# Patient Record
Sex: FEMALE | Race: WHITE | NOT HISPANIC OR LATINO
[De-identification: names, ages, dates, MRNs, and addresses within clinical notes are randomized per-mention and may not be internally consistent; named-entity substitution may affect disease eponyms.]

---

## 2017-11-27 ENCOUNTER — APPOINTMENT (OUTPATIENT)
Dept: FAMILY MEDICINE | Facility: CLINIC | Age: 32
End: 2017-11-27
Payer: COMMERCIAL

## 2017-11-27 VITALS
HEIGHT: 63 IN | WEIGHT: 241 LBS | DIASTOLIC BLOOD PRESSURE: 90 MMHG | RESPIRATION RATE: 14 BRPM | OXYGEN SATURATION: 97 % | SYSTOLIC BLOOD PRESSURE: 138 MMHG | HEART RATE: 74 BPM | BODY MASS INDEX: 42.7 KG/M2

## 2017-11-27 DIAGNOSIS — Z82.49 FAMILY HISTORY OF ISCHEMIC HEART DISEASE AND OTHER DISEASES OF THE CIRCULATORY SYSTEM: ICD-10-CM

## 2017-11-27 DIAGNOSIS — Z86.69 PERSONAL HISTORY OF OTHER DISEASES OF THE NERVOUS SYSTEM AND SENSE ORGANS: ICD-10-CM

## 2017-11-27 PROCEDURE — 99213 OFFICE O/P EST LOW 20 MIN: CPT

## 2018-08-22 ENCOUNTER — TRANSCRIPTION ENCOUNTER (OUTPATIENT)
Age: 33
End: 2018-08-22

## 2018-08-22 ENCOUNTER — APPOINTMENT (OUTPATIENT)
Dept: FAMILY MEDICINE | Facility: CLINIC | Age: 33
End: 2018-08-22
Payer: COMMERCIAL

## 2018-08-22 VITALS
DIASTOLIC BLOOD PRESSURE: 68 MMHG | RESPIRATION RATE: 14 BRPM | SYSTOLIC BLOOD PRESSURE: 112 MMHG | OXYGEN SATURATION: 99 % | BODY MASS INDEX: 40.12 KG/M2 | WEIGHT: 235 LBS | HEART RATE: 73 BPM | HEIGHT: 64 IN

## 2018-08-22 DIAGNOSIS — Z13.0 ENCOUNTER FOR SCREENING FOR DISEASES OF THE BLOOD AND BLOOD-FORMING ORGANS AND CERTAIN DISORDERS INVOLVING THE IMMUNE MECHANISM: ICD-10-CM

## 2018-08-22 DIAGNOSIS — Z13.21 ENCOUNTER FOR SCREENING FOR NUTRITIONAL DISORDER: ICD-10-CM

## 2018-08-22 DIAGNOSIS — Z13.29 ENCOUNTER FOR SCREENING FOR OTHER SUSPECTED ENDOCRINE DISORDER: ICD-10-CM

## 2018-08-22 DIAGNOSIS — Z13.220 ENCOUNTER FOR SCREENING FOR LIPOID DISORDERS: ICD-10-CM

## 2018-08-22 DIAGNOSIS — Z78.9 OTHER SPECIFIED HEALTH STATUS: ICD-10-CM

## 2018-08-22 DIAGNOSIS — Z13.1 ENCOUNTER FOR SCREENING FOR DIABETES MELLITUS: ICD-10-CM

## 2018-08-22 DIAGNOSIS — Z13.228 ENCOUNTER FOR SCREENING FOR OTHER METABOLIC DISORDERS: ICD-10-CM

## 2018-08-22 DIAGNOSIS — E66.01 MORBID (SEVERE) OBESITY DUE TO EXCESS CALORIES: ICD-10-CM

## 2018-08-22 LAB
BILIRUB UR QL STRIP: NORMAL
CLARITY UR: CLEAR
COLLECTION METHOD: NORMAL
CYTOLOGY CVX/VAG DOC THIN PREP: NORMAL
GLUCOSE UR-MCNC: NORMAL
HCG UR QL: 0.2 EU/DL
HGB UR QL STRIP.AUTO: NORMAL
KETONES UR-MCNC: NORMAL
LEUKOCYTE ESTERASE UR QL STRIP: NORMAL
NITRITE UR QL STRIP: NORMAL
PH UR STRIP: 6.5
PROT UR STRIP-MCNC: NORMAL
SP GR UR STRIP: 1.02

## 2018-08-22 PROCEDURE — 99395 PREV VISIT EST AGE 18-39: CPT | Mod: 25

## 2018-08-22 PROCEDURE — 81003 URINALYSIS AUTO W/O SCOPE: CPT | Mod: QW

## 2018-08-22 RX ORDER — AMOXICILLIN 875 MG/1
875 TABLET, FILM COATED ORAL
Qty: 20 | Refills: 0 | Status: DISCONTINUED | COMMUNITY
Start: 2017-11-27 | End: 2018-08-22

## 2018-08-22 NOTE — HEALTH RISK ASSESSMENT
[Very Good] : ~his/her~ current health as very good [Good] : ~his/her~  mood as  good [No falls in past year] : Patient reported no falls in the past year [0] : 2) Feeling down, depressed, or hopeless: Not at all (0) [Patient reported mammogram was normal] : Patient reported mammogram was normal [HIV test declined] : HIV test declined [Hepatitis C test declined] : Hepatitis C test declined [None] : None [With Significant Other] : lives with significant other [Employed] : employed [College] : College [] :  [Feels Safe at Home] : Feels safe at home [Fully functional (bathing, dressing, toileting, transferring, walking, feeding)] : Fully functional (bathing, dressing, toileting, transferring, walking, feeding) [Fully functional (using the telephone, shopping, preparing meals, housekeeping, doing laundry, using] : Fully functional and needs no help or supervision to perform IADLs (using the telephone, shopping, preparing meals, housekeeping, doing laundry, using transportation, managing medications and managing finances) [Smoke Detector] : smoke detector [Carbon Monoxide Detector] : carbon monoxide detector [Safety elements used in home] : safety elements used in home [Seat Belt] :  uses seat belt [Sunscreen] : uses sunscreen [Discussed at today's visit] : Advance Directives Discussed at today's visit [] : No [de-identified] : occasional [[id number]] : 0 [Change in mental status noted] : No change in mental status noted [Language] : denies difficulty with language [Handling Complex Tasks] : denies difficulty handling complex tasks [Reports changes in hearing] : Reports no changes in hearing [Reports changes in vision] : Reports no changes in vision [Reports changes in dental health] : Reports no changes in dental health [Guns at Home] : no guns at home [MammogramDate] : Jun2011 [PapSmearDate] : 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

## 2018-08-22 NOTE — PHYSICAL EXAM
[No Acute Distress] : no acute distress [Well-Appearing] : well-appearing [Normal Sclera/Conjunctiva] : normal sclera/conjunctiva [PERRL] : pupils equal round and reactive to light [EOMI] : extraocular movements intact [Normal Outer Ear/Nose] : the outer ears and nose were normal in appearance [Normal Oropharynx] : the oropharynx was normal [Normal TMs] : both tympanic membranes were normal [No JVD] : no jugular venous distention [Supple] : supple [No Lymphadenopathy] : no lymphadenopathy [Thyroid Normal, No Nodules] : the thyroid was normal and there were no nodules present [No Respiratory Distress] : no respiratory distress  [Clear to Auscultation] : lungs were clear to auscultation bilaterally [Normal Rate] : normal rate  [Regular Rhythm] : with a regular rhythm [Normal S1, S2] : normal S1 and S2 [No Murmur] : no murmur heard [No Carotid Bruits] : no carotid bruits [No Varicosities] : no varicosities [Pedal Pulses Present] : the pedal pulses are present [No Edema] : there was no peripheral edema [No Extremity Clubbing/Cyanosis] : no extremity clubbing/cyanosis [Soft] : abdomen soft [Non Tender] : non-tender [Normal Bowel Sounds] : normal bowel sounds [Normal Posterior Cervical Nodes] : no posterior cervical lymphadenopathy [Normal Anterior Cervical Nodes] : no anterior cervical lymphadenopathy [No CVA Tenderness] : no CVA  tenderness [No Spinal Tenderness] : no spinal tenderness [No Joint Swelling] : no joint swelling [Grossly Normal Strength/Tone] : grossly normal strength/tone [No Rash] : no rash [Normal Gait] : normal gait [Coordination Grossly Intact] : coordination grossly intact [No Focal Deficits] : no focal deficits [Deep Tendon Reflexes (DTR)] : deep tendon reflexes were 2+ and symmetric [Speech Grossly Normal] : speech grossly normal [Memory Grossly Normal] : memory grossly normal [Normal Affect] : the affect was normal [Alert and Oriented x3] : oriented to person, place, and time [Normal Mood] : the mood was normal [Normal Insight/Judgement] : insight and judgment were intact [de-identified] : obese [de-identified] : left tonsil 2/4 and right is 1/4, no redness, no exudates

## 2018-08-22 NOTE — HISTORY OF PRESENT ILLNESS
[FreeTextEntry1] : pt presents for cpe. +left neck enlargement X3 weeks  Left tonsil enlarged but no sore throat [de-identified] : Here for a PE

## 2018-08-22 NOTE — ASSESSMENT
[FreeTextEntry1] : UA reveiwed with pt. STRESSED DIET and exercise to lose wt - must make it a priority.  Lab done HERE today. Stressed need to schedule a gyn exam with [facility].

## 2018-08-23 LAB
25(OH)D3 SERPL-MCNC: 40.6 NG/ML
ALBUMIN SERPL ELPH-MCNC: 4.9 G/DL
ALP BLD-CCNC: 70 U/L
ALT SERPL-CCNC: 9 U/L
ANION GAP SERPL CALC-SCNC: 16 MMOL/L
AST SERPL-CCNC: 16 U/L
BASOPHILS # BLD AUTO: 0.05 K/UL
BASOPHILS NFR BLD AUTO: 0.7 %
BILIRUB SERPL-MCNC: 0.4 MG/DL
BUN SERPL-MCNC: 13 MG/DL
CALCIUM SERPL-MCNC: 9.6 MG/DL
CHLORIDE SERPL-SCNC: 102 MMOL/L
CHOLEST SERPL-MCNC: 230 MG/DL
CHOLEST/HDLC SERPL: 5.5 RATIO
CO2 SERPL-SCNC: 22 MMOL/L
CREAT SERPL-MCNC: 0.68 MG/DL
EOSINOPHIL # BLD AUTO: 0.16 K/UL
EOSINOPHIL NFR BLD AUTO: 2.2 %
FOLATE SERPL-MCNC: 5.4 NG/ML
GLUCOSE SERPL-MCNC: 89 MG/DL
HBA1C MFR BLD HPLC: 5.3 %
HCT VFR BLD CALC: 42.2 %
HDLC SERPL-MCNC: 42 MG/DL
HGB BLD-MCNC: 13.5 G/DL
IMM GRANULOCYTES NFR BLD AUTO: 0 %
IRON SERPL-MCNC: 120 UG/DL
LDLC SERPL CALC-MCNC: 162 MG/DL
LYMPHOCYTES # BLD AUTO: 2.72 K/UL
LYMPHOCYTES NFR BLD AUTO: 38.1 %
MAN DIFF?: NORMAL
MCHC RBC-ENTMCNC: 29.2 PG
MCHC RBC-ENTMCNC: 32 GM/DL
MCV RBC AUTO: 91.3 FL
MONOCYTES # BLD AUTO: 0.42 K/UL
MONOCYTES NFR BLD AUTO: 5.9 %
NEUTROPHILS # BLD AUTO: 3.78 K/UL
NEUTROPHILS NFR BLD AUTO: 53.1 %
PLATELET # BLD AUTO: 430 K/UL
POTASSIUM SERPL-SCNC: 4.1 MMOL/L
PROT SERPL-MCNC: 8.1 G/DL
RBC # BLD: 4.62 M/UL
RBC # FLD: 13.2 %
SODIUM SERPL-SCNC: 140 MMOL/L
T3 SERPL-MCNC: 115 NG/DL
T3FREE SERPL-MCNC: 3.46 PG/ML
T4 FREE SERPL-MCNC: 1.6 NG/DL
T4 SERPL-MCNC: 8.1 UG/DL
TRIGL SERPL-MCNC: 128 MG/DL
TSH SERPL-ACNC: 2.88 UIU/ML
VIT B12 SERPL-MCNC: 408 PG/ML
WBC # FLD AUTO: 7.13 K/UL

## 2018-10-14 PROBLEM — Z86.718 HISTORY OF DEEP VENOUS THROMBOSIS: Status: RESOLVED | Noted: 2018-10-14 | Resolved: 2018-10-14

## 2018-10-14 PROBLEM — Z86.711 HISTORY OF PULMONARY EMBOLISM: Status: RESOLVED | Noted: 2018-10-14 | Resolved: 2018-10-14

## 2018-10-18 ENCOUNTER — APPOINTMENT (OUTPATIENT)
Dept: FAMILY MEDICINE | Facility: CLINIC | Age: 33
End: 2018-10-18
Payer: COMMERCIAL

## 2018-10-18 DIAGNOSIS — Z86.711 PERSONAL HISTORY OF PULMONARY EMBOLISM: ICD-10-CM

## 2018-10-18 DIAGNOSIS — Z86.718 PERSONAL HISTORY OF OTHER VENOUS THROMBOSIS AND EMBOLISM: ICD-10-CM

## 2019-01-10 ENCOUNTER — APPOINTMENT (OUTPATIENT)
Dept: FAMILY MEDICINE | Facility: CLINIC | Age: 34
End: 2019-01-10
Payer: COMMERCIAL

## 2019-01-10 VITALS
DIASTOLIC BLOOD PRESSURE: 80 MMHG | HEIGHT: 64 IN | OXYGEN SATURATION: 98 % | BODY MASS INDEX: 40.12 KG/M2 | SYSTOLIC BLOOD PRESSURE: 120 MMHG | WEIGHT: 235 LBS | RESPIRATION RATE: 14 BRPM | HEART RATE: 78 BPM

## 2019-01-10 DIAGNOSIS — E28.2 POLYCYSTIC OVARIAN SYNDROME: ICD-10-CM

## 2019-01-10 DIAGNOSIS — Z01.419 ENCOUNTER FOR GYNECOLOGICAL EXAMINATION (GENERAL) (ROUTINE) W/OUT ABNORMAL FINDINGS: ICD-10-CM

## 2019-01-10 PROCEDURE — 99214 OFFICE O/P EST MOD 30 MIN: CPT | Mod: 25

## 2019-01-10 NOTE — COUNSELING
[Breast Self Exam] : breast self exam [Nutrition] : nutrition [Exercise] : exercise [STD (testing, results, tx)] : STD (testing, results, tx) [Method Specific Consent] : method specific consent [Safe Sexual Practices] : safe sexual practices

## 2019-01-10 NOTE — HISTORY OF PRESENT ILLNESS
[___ Year(s) Ago] : [unfilled] year(s) ago [Good] : being in good health [Weight Concerns] : weight concens [Last Pap Smear ___] : last Papanicolaou cytology done [unfilled] [No Previous Mammogram] : no previous mammogram [No Self Breast Exam] : no self breast examinations [No Previous Colonoscopy] : no previous colonoscopy [Performed Within 5 Years] : lipid profile performed within the past five years [Never Performed] : no previous DEXA [Obesity] : obesity [Sedentary Lifestyle] : sedentary lifestyle [FH Cardiovascular Disease] : family history of cardiovascular disease [Reproductive Age] : is of reproductive age [Menstrual Problems] : reports abnormal menses [Pregnancy History] : pregnancy history: [Total Preg ___] : [unfilled] [Full Term ___] : [unfilled] [HPV Vaccine ___] : HPV vaccine [unfilled] [Irregular Menses] : irregular menses [Moderate Bleeding] : described as moderate in severity [___ Weeks] : began [unfilled] weeks ago [Definite:  ___ (Date)] : the last menstrual period was [unfilled] [Normal Amount/Duration] : was of a normal amount and duration [Regular Cycle Intervals] : periods have been regular [Frequency: Q ___ days] : menstrual periods occur approximately every [unfilled] days [Menarche Age: ____] : age at menarche was [unfilled] [Sexually Active] : is sexually active [Monogamous] : is monogamous [Age of First Sexual [place] ___] : became sexually active at the age of [unfilled] [Male ___] : [unfilled] male [Healthy Diet] : not having a healthy diet [Regular Exercise] : not exercising regularly [Hypertension] : no hypertension [Diabetes] : no diabetes [High LDL] : no high LDL cholesterol [Low HDL] : no low HDL cholesterol [Stress] : no stress [Tobacco Use] : no tobacco use [Illicit Drug Use] : no illicit drug use [Previous Breast Cancer] : no previous breast cancer [Abnormal Cervical Cytology] : no abnormal cervical cytology [HPV Positive] : no positive screening for human papilloma virus [Hormone Therapy] : no hormone therapy [Fever] : no fever [Nausea] : no nausea [Vomiting] : no vomiting [Diarrhea] : no diarrhea [Vaginal Bleeding] : no vaginal bleeding [Pelvic Pressure] : no pelvic pressure [Dysuria] : no dysuria [Pain] : no pelvic pain [Rest] : not improved with rest [Exercise] : not worsened with exercise [Localized Pain] : no localized pain [Spotting Between  Menses] : no spotting between menses [Menstrual Cramps] : no menstrual cramps [On BCP at conception] : the patient was not on BCP at conception [Contraception] : does not use contraception

## 2019-01-10 NOTE — PROCEDURE
[Cervical Pap Smear] : cervical Pap smear [Liquid Base] : liquid base [GC & Chlamydia via Pap] : GC & Chlamydia via Pap [Tolerated Well] : the patient tolerated the procedure well

## 2019-01-15 LAB — CYTOLOGY CVX/VAG DOC THIN PREP: NORMAL

## 2020-01-30 ENCOUNTER — TRANSCRIPTION ENCOUNTER (OUTPATIENT)
Age: 35
End: 2020-01-30

## 2020-01-31 ENCOUNTER — TRANSCRIPTION ENCOUNTER (OUTPATIENT)
Age: 35
End: 2020-01-31

## 2020-12-15 PROBLEM — Z86.69 HISTORY OF ACUTE OTITIS MEDIA: Status: RESOLVED | Noted: 2017-11-27 | Resolved: 2020-12-15

## 2020-12-21 PROBLEM — Z01.419 ENCOUNTER FOR GYNECOLOGICAL EXAMINATION WITHOUT ABNORMAL FINDING: Status: RESOLVED | Noted: 2019-01-10 | Resolved: 2020-12-21

## 2023-06-15 ENCOUNTER — LABORATORY RESULT (OUTPATIENT)
Age: 38
End: 2023-06-15

## 2023-06-15 ENCOUNTER — APPOINTMENT (OUTPATIENT)
Dept: FAMILY MEDICINE | Facility: CLINIC | Age: 38
End: 2023-06-15
Payer: COMMERCIAL

## 2023-06-15 ENCOUNTER — NON-APPOINTMENT (OUTPATIENT)
Age: 38
End: 2023-06-15

## 2023-06-15 VITALS
BODY MASS INDEX: 37.05 KG/M2 | DIASTOLIC BLOOD PRESSURE: 82 MMHG | OXYGEN SATURATION: 98 % | RESPIRATION RATE: 14 BRPM | WEIGHT: 217 LBS | SYSTOLIC BLOOD PRESSURE: 110 MMHG | TEMPERATURE: 98.2 F | HEIGHT: 64 IN | HEART RATE: 74 BPM

## 2023-06-15 DIAGNOSIS — Z76.89 PERSONS ENCOUNTERING HEALTH SERVICES IN OTHER SPECIFIED CIRCUMSTANCES: ICD-10-CM

## 2023-06-15 DIAGNOSIS — Z80.3 FAMILY HISTORY OF MALIGNANT NEOPLASM OF BREAST: ICD-10-CM

## 2023-06-15 DIAGNOSIS — E78.5 HYPERLIPIDEMIA, UNSPECIFIED: ICD-10-CM

## 2023-06-15 DIAGNOSIS — Z82.49 FAMILY HISTORY OF ISCHEMIC HEART DISEASE AND OTHER DISEASES OF THE CIRCULATORY SYSTEM: ICD-10-CM

## 2023-06-15 DIAGNOSIS — R00.2 PALPITATIONS: ICD-10-CM

## 2023-06-15 LAB — CYTOLOGY CVX/VAG DOC THIN PREP: NORMAL

## 2023-06-15 PROCEDURE — 36415 COLL VENOUS BLD VENIPUNCTURE: CPT

## 2023-06-15 PROCEDURE — 99204 OFFICE O/P NEW MOD 45 MIN: CPT | Mod: 25

## 2023-06-15 NOTE — HEALTH RISK ASSESSMENT
[0] : 2) Feeling down, depressed, or hopeless: Not at all (0) [PHQ-2 Negative - No further assessment needed] : PHQ-2 Negative - No further assessment needed [Never] : Never [de-identified] : has exercise bike in the basement and walks around the neighborhood  [de-identified] : decreased red meat and pork, more sea food and chicken breasts  [[id number]] : 0

## 2023-06-15 NOTE — HISTORY OF PRESENT ILLNESS
[FreeTextEntry8] : patient presents to establish care \ozzie \ozzie PResenting in office to establish care, in april she has brought herself to the ED with palpitations, afterwards she followed up with three [facility] cardiology and was told she was fine, however needed to establish with a PCP and have updated lab work. \ozzie \ozzie November 2011 moved up here from [place], she is an  in [place] and works from home\ozzie \ozzie Plays video games,

## 2023-06-15 NOTE — PLAN
[FreeTextEntry1] : HLD\par will check fasting labs today\par \par established\par \par RTO 2 weeks for physical examination

## 2023-06-16 LAB
ALBUMIN SERPL ELPH-MCNC: 4.5 G/DL
ALP BLD-CCNC: 76 U/L
ALT SERPL-CCNC: 17 U/L
ANION GAP SERPL CALC-SCNC: 13 MMOL/L
APPEARANCE: ABNORMAL
AST SERPL-CCNC: 18 U/L
BILIRUB SERPL-MCNC: 0.3 MG/DL
BILIRUBIN URINE: NEGATIVE
BLOOD URINE: NEGATIVE
BUN SERPL-MCNC: 13 MG/DL
CALCIUM SERPL-MCNC: 9.7 MG/DL
CHLORIDE SERPL-SCNC: 105 MMOL/L
CHOLEST SERPL-MCNC: 216 MG/DL
CO2 SERPL-SCNC: 23 MMOL/L
COLOR: YELLOW
CREAT SERPL-MCNC: 0.8 MG/DL
EGFR: 98 ML/MIN/1.73M2
ESTIMATED AVERAGE GLUCOSE: 105 MG/DL
FOLATE SERPL-MCNC: 11.6 NG/ML
GLUCOSE QUALITATIVE U: NEGATIVE MG/DL
GLUCOSE SERPL-MCNC: 86 MG/DL
HBA1C MFR BLD HPLC: 5.3 %
HDLC SERPL-MCNC: 46 MG/DL
KETONES URINE: NEGATIVE MG/DL
LDLC SERPL CALC-MCNC: 153 MG/DL
LEUKOCYTE ESTERASE URINE: NEGATIVE
NITRITE URINE: NEGATIVE
NONHDLC SERPL-MCNC: 170 MG/DL
PH URINE: 5.5
POTASSIUM SERPL-SCNC: 4.6 MMOL/L
PROT SERPL-MCNC: 7.4 G/DL
PROTEIN URINE: NEGATIVE MG/DL
SODIUM SERPL-SCNC: 140 MMOL/L
SPECIFIC GRAVITY URINE: 1.02
T3FREE SERPL-MCNC: 3.2 PG/ML
T4 FREE SERPL-MCNC: 1.4 NG/DL
TRIGL SERPL-MCNC: 88 MG/DL
TSH SERPL-ACNC: 3.46 UIU/ML
UROBILINOGEN URINE: 0.2 MG/DL
VIT B12 SERPL-MCNC: 360 PG/ML

## 2023-06-27 ENCOUNTER — APPOINTMENT (OUTPATIENT)
Dept: FAMILY MEDICINE | Facility: CLINIC | Age: 38
End: 2023-06-27

## 2023-10-17 DIAGNOSIS — Z00.00 ENCOUNTER FOR GENERAL ADULT MEDICAL EXAMINATION W/OUT ABNORMAL FINDINGS: ICD-10-CM

## 2023-10-23 ENCOUNTER — LABORATORY RESULT (OUTPATIENT)
Age: 38
End: 2023-10-23

## 2023-10-28 LAB
APO LP(A) SERPL-MCNC: 200.6 NMOL/L
CHOLEST SERPL-MCNC: 227 MG/DL
HDLC SERPL-MCNC: 46 MG/DL
LDLC SERPL CALC-MCNC: 158 MG/DL
NONHDLC SERPL-MCNC: 182 MG/DL
TRIGL SERPL-MCNC: 132 MG/DL

## 2024-01-24 ENCOUNTER — APPOINTMENT (OUTPATIENT)
Dept: GYNECOLOGIC ONCOLOGY | Facility: CLINIC | Age: 39
End: 2024-01-24
Payer: COMMERCIAL

## 2024-01-24 VITALS
SYSTOLIC BLOOD PRESSURE: 138 MMHG | OXYGEN SATURATION: 98 % | HEIGHT: 64 IN | BODY MASS INDEX: 36.7 KG/M2 | DIASTOLIC BLOOD PRESSURE: 92 MMHG | HEART RATE: 73 BPM | RESPIRATION RATE: 16 BRPM | WEIGHT: 215 LBS

## 2024-01-24 DIAGNOSIS — Z80.3 FAMILY HISTORY OF MALIGNANT NEOPLASM OF BREAST: ICD-10-CM

## 2024-01-24 PROCEDURE — 99459 PELVIC EXAMINATION: CPT

## 2024-01-24 PROCEDURE — 99204 OFFICE O/P NEW MOD 45 MIN: CPT | Mod: 25

## 2024-01-24 NOTE — PROCEDURE
[Cervical Pap] : cervical pap smear  [Other: ___] : [unfilled] [Patient] : the patient [Verbal Consent] : verbal consent was obtained prior to the procedure and is detailed in the patient's record [None] : none [Yes] : the specimen was sent to pathology [No Complications] : none [Tolerated Well] : the patient tolerated the procedure well

## 2024-01-25 LAB — HPV HIGH+LOW RISK DNA PNL CVX: NOT DETECTED

## 2024-01-26 NOTE — PLAN
[TextEntry] : MRI pelvis w/wo contrast and tumor markers  Follow up in 2 weeks to discuss results and further treatment plan Screening PAP done today

## 2024-01-26 NOTE — END OF VISIT
[FreeTextEntry3] : This note accurately reflects the work and decisions made by me. Written by Ignacia Payton PA-C acting as a scribe for Dr. Hany Link.

## 2024-01-26 NOTE — CHIEF COMPLAINT
[FreeTextEntry1] : [facility] [phone] at [address] [place] [address]

## 2024-01-26 NOTE — ASSESSMENT
[FreeTextEntry1] : 39 y/o female with findings of possible degenerating fibroid and cystic pelvic mass which contains internal soft tissue and wall nodularity. I discussed with the patient and her  present uncertain if a malignancy is currently present but discussed the possibility of mucinous cystadenoma vs. borderline vs. early ovarian cancer. I discussed recommendation for MRI pelvis to better evaluate cyst characteristics which will also help to determine surgical approach either laparoscopic vs. open. I also recommend for her and her  to determine childbearing status as if a cancer is found it will determine fertility sparing surgery vs. recommended surgery etc.

## 2024-01-26 NOTE — PHYSICAL EXAM
[Chaperone Present] : A chaperone was present in the examining room during all aspects of the physical examination [Normal] : Bimanual Exam: Normal [FreeTextEntry1] : Ignacia Payton PA-C  [de-identified] : palpable fullness noted LLQ  [de-identified] : 8-9 week sized uterus, mobile [de-identified] : No masses or nodularities noted within the pelvis. Likely mass outside of pelvis

## 2024-01-26 NOTE — HISTORY OF PRESENT ILLNESS
[FreeTextEntry1] : 39 y/o nulligravida female, LMP 1/13/24 being referred from [facility] for evaluation of ovarian cyst. She reports she presented to [facility] with left lower abdominal cramping on the day she started her menstrual cycle. She reports her pain has subsided since then and her cycle was as normal. Denies vaginal bleeding/discharge, abdominal pain/bloating/distension, pelvis discomfort, changes in normal bowel/urinary habits, nausea/vomiting, unintentional weight loss/gain, and all other associated signs and symptoms. She is currently sexually active, denies pain and/or bleeding.   1/13/24 CT A/P: 5.0 x 3.6 x 3.7 cm low-density mass within the central portion of the uterus which appears to displace the endometrial cavity to the left side and may represent a fibroid. Bilobed septated 10.9 x 10.1 x 15.8 cm complex cystic pelvic mass in the midline but abbuting and probably arising from the left ovary. It contains internal soft tissue and wall nodularity. Small right lower quadrant ascites with questionable trace nodularities, cannot exclude peritoneal implants. A couple of punctate pelvic calcifications likely calcified phleboliths.   1/13/24 US: Incompletely visualized mixed cystic and solid likely ovarian neoplasm.   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 She reports she received anticoagulation treatment. She is uncertain if she underwent hematology work up, this was diagnosed and worked up in [place].   Health Screening: Last Pap: 2020; normal as per patient

## 2024-01-29 LAB — CYTOLOGY CVX/VAG DOC THIN PREP: NORMAL

## 2024-01-31 ENCOUNTER — APPOINTMENT (OUTPATIENT)
Dept: MRI IMAGING | Facility: CLINIC | Age: 39
End: 2024-01-31
Payer: COMMERCIAL

## 2024-01-31 ENCOUNTER — OUTPATIENT (OUTPATIENT)
Dept: OUTPATIENT SERVICES | Facility: HOSPITAL | Age: 39
LOS: 1 days | End: 2024-01-31
Payer: COMMERCIAL

## 2024-01-31 DIAGNOSIS — R19.00 INTRA-ABDOMINAL AND PELVIC SWELLING, MASS AND LUMP, UNSPECIFIED SITE: ICD-10-CM

## 2024-01-31 PROCEDURE — 72197 MRI PELVIS W/O & W/DYE: CPT

## 2024-01-31 PROCEDURE — 72197 MRI PELVIS W/O & W/DYE: CPT | Mod: 26

## 2024-01-31 PROCEDURE — A9585: CPT

## 2024-02-07 ENCOUNTER — APPOINTMENT (OUTPATIENT)
Dept: GYNECOLOGIC ONCOLOGY | Facility: CLINIC | Age: 39
End: 2024-02-07
Payer: COMMERCIAL

## 2024-02-07 PROCEDURE — 99214 OFFICE O/P EST MOD 30 MIN: CPT

## 2024-02-07 PROCEDURE — 99459 PELVIC EXAMINATION: CPT

## 2024-02-07 NOTE — HISTORY OF PRESENT ILLNESS
[FreeTextEntry1] : /39 y/o female with findings of possible degenerating fibroid and cystic pelvic mass which contains internal soft tissue and wall nodularity. I discussed with the patient and her  present uncertain if a malignancy is currently present but discussed the possibility of mucinous cystadenoma vs. borderline vs. early ovarian cancer. I discussed recommendation for MRI pelvis to better evaluate cyst characteristics which will also help to determine surgical approach either laparoscopic vs. open. I also recommended for her and her  to determine childbearing status as if a cancer is found it will determine fertility sparing surgery vs. recommended surgery etc. SHe returns to the office today to review MRI and blood work.   Ca125 1/29/24 was 48 Ca19-9 was 10 CEA was < 0.6  MRI pelvis 1/31/24: IMPRESSION: Large left adnexal 16.3 cm macrocystic neoplasm as described highly suspicious for malignancy, differential considerations include left ovarian versus fallopian tube carcinoma Leiomyomatous uterus

## 2024-02-07 NOTE — ASSESSMENT
[FreeTextEntry1] : Pt is a 37 yo with a 16 cm left ovarian mass. She had MRI and presents for finalization of management plan. The MRI results concerning for an ovarian neoplasm. Given patients age I recommend fertility sparing surgery and I also clinically suspect a borderline serous/mucinous tumor. No evidence of metastatic disease.   We reviewed the abnormal findings inside the uterus (leiomyoma, adenomyosis) and she understands she will likely need a hysterectomy at spome point in her left, but currently I would not recommend surgery because of those findings. If there was concern for cancer on final pathology we could always discuss another surgery in the future. The patient agrees with conservative approach.   I discussed at length with the patient the nature, purpose, risks, benefits, and alternatives of exploratory laparotomy, left salpingo-oophorectomy, possible staging via a vertical, midline incision.  The patient understands the risks to include (but not be limited to) bowel injury, bleeding (with the possible need for transfusion), bladder or ureteral injury, infections, protracted wound closure, deep venous thrombosis, and liane-operative death.  She is also aware of  the possibility of  a unrecognized surgical complication with need for subsequent re-exploration.  She understands the implications that removing both ovaries may have on the quality of her life.  She agrees to proceed.  She asked numerous questions which were answered to her satisfaction.  She understands the need for a pre-operative bowel preparation and agrees to comply with our instructions.

## 2024-02-07 NOTE — END OF VISIT
[FreeTextEntry3] : Ex-lap, LSO, possible staging Pre-surgical testing, CBC, BMP, type and screen, pregnancy test [FreeTextEntry2] : Dayana Sherman MA was present the entire duration of the patient interaction and gynecological exam.

## 2024-02-07 NOTE — PHYSICAL EXAM
[Chaperone Present] : A chaperone was present in the examining room during all aspects of the physical examination [FreeTextEntry1] : Dayana Sherman MA was present the entire duration of the patient interaction and gynecological exam. [Normal] : Mood and affect: Normal [Fully active, able to carry on all pre-disease performance without restriction] : Status 0 - Fully active, able to carry on all pre-disease performance without restriction

## 2024-02-07 NOTE — REASON FOR VISIT
[FreeTextEntry1] : [facility] [place]. [phone] [address] [place] [address]

## 2024-02-08 LAB
CANCER AG125 SERPL-ACNC: 48 U/ML
CANCER AG19-9 SERPL-ACNC: 10 U/ML
CEA SERPL-MCNC: <0.6 NG/ML

## 2024-02-13 ENCOUNTER — OUTPATIENT (OUTPATIENT)
Dept: OUTPATIENT SERVICES | Facility: HOSPITAL | Age: 39
LOS: 1 days | End: 2024-02-13
Payer: COMMERCIAL

## 2024-02-13 VITALS
DIASTOLIC BLOOD PRESSURE: 84 MMHG | RESPIRATION RATE: 16 BRPM | SYSTOLIC BLOOD PRESSURE: 130 MMHG | HEIGHT: 64 IN | HEART RATE: 78 BPM | WEIGHT: 220.02 LBS | OXYGEN SATURATION: 100 % | TEMPERATURE: 98 F

## 2024-02-13 DIAGNOSIS — R19.00 INTRA-ABDOMINAL AND PELVIC SWELLING, MASS AND LUMP, UNSPECIFIED SITE: ICD-10-CM

## 2024-02-13 DIAGNOSIS — Z01.818 ENCOUNTER FOR OTHER PREPROCEDURAL EXAMINATION: ICD-10-CM

## 2024-02-13 LAB
A1C WITH ESTIMATED AVERAGE GLUCOSE RESULT: 5.2 % — SIGNIFICANT CHANGE UP (ref 4–5.6)
ABO RH CONFIRMATION: SIGNIFICANT CHANGE UP
ANION GAP SERPL CALC-SCNC: 3 MMOL/L — LOW (ref 5–17)
APTT BLD: 29 SEC — SIGNIFICANT CHANGE UP (ref 24.5–35.6)
BASOPHILS # BLD AUTO: 0.05 K/UL — SIGNIFICANT CHANGE UP (ref 0–0.2)
BASOPHILS NFR BLD AUTO: 1 % — SIGNIFICANT CHANGE UP (ref 0–2)
BLD GP AB SCN SERPL QL: SIGNIFICANT CHANGE UP
BUN SERPL-MCNC: 12 MG/DL — SIGNIFICANT CHANGE UP (ref 7–23)
CALCIUM SERPL-MCNC: 8.9 MG/DL — SIGNIFICANT CHANGE UP (ref 8.5–10.1)
CANCER AG125 SERPL-ACNC: 61 U/ML — HIGH
CHLORIDE SERPL-SCNC: 111 MMOL/L — HIGH (ref 96–108)
CO2 SERPL-SCNC: 25 MMOL/L — SIGNIFICANT CHANGE UP (ref 22–31)
CREAT SERPL-MCNC: 0.59 MG/DL — SIGNIFICANT CHANGE UP (ref 0.5–1.3)
EGFR: 120 ML/MIN/1.73M2 — SIGNIFICANT CHANGE UP
EOSINOPHIL # BLD AUTO: 0.18 K/UL — SIGNIFICANT CHANGE UP (ref 0–0.5)
EOSINOPHIL NFR BLD AUTO: 3.5 % — SIGNIFICANT CHANGE UP (ref 0–6)
ESTIMATED AVERAGE GLUCOSE: 103 MG/DL — SIGNIFICANT CHANGE UP (ref 68–114)
GLUCOSE SERPL-MCNC: 95 MG/DL — SIGNIFICANT CHANGE UP (ref 70–99)
HCG SERPL-ACNC: <1 MIU/ML — SIGNIFICANT CHANGE UP
HCG-TM SERPL-ACNC: <1 MIU/ML — SIGNIFICANT CHANGE UP
HCT VFR BLD CALC: 37.4 % — SIGNIFICANT CHANGE UP (ref 34.5–45)
HGB BLD-MCNC: 12.6 G/DL — SIGNIFICANT CHANGE UP (ref 11.5–15.5)
IMM GRANULOCYTES NFR BLD AUTO: 0.2 % — SIGNIFICANT CHANGE UP (ref 0–0.9)
LYMPHOCYTES # BLD AUTO: 1.73 K/UL — SIGNIFICANT CHANGE UP (ref 1–3.3)
LYMPHOCYTES # BLD AUTO: 33.3 % — SIGNIFICANT CHANGE UP (ref 13–44)
MCHC RBC-ENTMCNC: 30.4 PG — SIGNIFICANT CHANGE UP (ref 27–34)
MCHC RBC-ENTMCNC: 33.7 GM/DL — SIGNIFICANT CHANGE UP (ref 32–36)
MCV RBC AUTO: 90.3 FL — SIGNIFICANT CHANGE UP (ref 80–100)
MONOCYTES # BLD AUTO: 0.55 K/UL — SIGNIFICANT CHANGE UP (ref 0–0.9)
MONOCYTES NFR BLD AUTO: 10.6 % — SIGNIFICANT CHANGE UP (ref 2–14)
NEUTROPHILS # BLD AUTO: 2.68 K/UL — SIGNIFICANT CHANGE UP (ref 1.8–7.4)
NEUTROPHILS NFR BLD AUTO: 51.4 % — SIGNIFICANT CHANGE UP (ref 43–77)
PLATELET # BLD AUTO: 315 K/UL — SIGNIFICANT CHANGE UP (ref 150–400)
POTASSIUM SERPL-MCNC: 3.4 MMOL/L — LOW (ref 3.5–5.3)
POTASSIUM SERPL-SCNC: 3.4 MMOL/L — LOW (ref 3.5–5.3)
RBC # BLD: 4.14 M/UL — SIGNIFICANT CHANGE UP (ref 3.8–5.2)
RBC # FLD: 12.3 % — SIGNIFICANT CHANGE UP (ref 10.3–14.5)
SODIUM SERPL-SCNC: 139 MMOL/L — SIGNIFICANT CHANGE UP (ref 135–145)
WBC # BLD: 5.2 K/UL — SIGNIFICANT CHANGE UP (ref 3.8–10.5)
WBC # FLD AUTO: 5.2 K/UL — SIGNIFICANT CHANGE UP (ref 3.8–10.5)

## 2024-02-13 PROCEDURE — 86850 RBC ANTIBODY SCREEN: CPT

## 2024-02-13 PROCEDURE — 83036 HEMOGLOBIN GLYCOSYLATED A1C: CPT

## 2024-02-13 PROCEDURE — 84702 CHORIONIC GONADOTROPIN TEST: CPT

## 2024-02-13 PROCEDURE — 85730 THROMBOPLASTIN TIME PARTIAL: CPT

## 2024-02-13 PROCEDURE — 85025 COMPLETE CBC W/AUTO DIFF WBC: CPT

## 2024-02-13 PROCEDURE — 99214 OFFICE O/P EST MOD 30 MIN: CPT | Mod: 25

## 2024-02-13 PROCEDURE — 80048 BASIC METABOLIC PNL TOTAL CA: CPT

## 2024-02-13 PROCEDURE — 84704 HCG FREE BETACHAIN TEST: CPT

## 2024-02-13 PROCEDURE — 86901 BLOOD TYPING SEROLOGIC RH(D): CPT

## 2024-02-13 PROCEDURE — 36415 COLL VENOUS BLD VENIPUNCTURE: CPT

## 2024-02-13 PROCEDURE — 86304 IMMUNOASSAY TUMOR CA 125: CPT

## 2024-02-13 PROCEDURE — 86900 BLOOD TYPING SEROLOGIC ABO: CPT

## 2024-02-13 NOTE — H&P PST ADULT - NSICDXPROCEDURE_GEN_ALL_CORE_FT
PROCEDURES:  Exploratory laparotomy with salpingo-oophorectomy 13-Feb-2024 07:14:55  Ragini Aguilar

## 2024-02-13 NOTE — H&P PST ADULT - PROBLEM SELECTOR PLAN 1
Pre op and chlorhexidine instructions given and explained.  Avoid NSAIDs and OTC supplements.   Patient verbalized understanding  medical consult requestedby surgeon  covid 19 swab scheduled, advised to quarantine after test Pre op and chlorhexidine instructions given and explained.  Avoid NSAIDs and OTC supplements.   Patient verbalized understanding  Urine for pregnancy on day of surgery Pre op and chlorhexidine instructions given and explained.  Avoid NSAIDs and OTC supplements.   Patient verbalized understanding   Urine for pregnancy on day of surgery

## 2024-02-13 NOTE — H&P PST ADULT - GASTROINTESTINAL
soft/nontender/nondistended/normal active bowel sounds negative soft/nontender/normal active bowel sounds

## 2024-02-13 NOTE — H&P PST ADULT - NSICDXPASTMEDICALHX_GEN_ALL_CORE_FT
PAST MEDICAL HISTORY:  History of ovarian cyst      PAST MEDICAL HISTORY:  History of ovarian cyst     Other intra-abdominal and pelvic swelling, mass and lump

## 2024-02-13 NOTE — H&P PST ADULT - HEIGHT IN FEET
Caller: PATIENT    Relationship to patient: SELF    Best call back number: [phone]    Chief complaint: PATIENT IS EXPERIENCING A LOT OF PAIN IN HER SHOULDER BLADE AREA RIGHT BELOW HER NECK. PATIENT STATED SHE HAS BEEN EXPERIENCING THIS PAIN FOR A FEW WEEKS NOW AND IS HAVING A HARD TIME WORKING FROM HOME WITH THIS PAIN.    Type of visit: FOLLOW UP    Requested date: ASAP    If rescheduling, when is the original appointment: 03.31.21 AT 9:10 AM    Additional notes: PATIENT WAS CALLING TO SCHEDULE A FOLLOW UP APPT WITH DR. LUCIA. I SCHEDULED PATIENT ON 03.31.21 WHICH WAS DR. LUCIA'S FIRST AVAILABILITY BUT PATIENT IS REQUESTING TO GET IN SOONER. PATIENT HAS NOT SEEN DR. LUCIA SINCE 06.06.2019 BUT IS HAVING PAIN IN MULTIPLE NEW AREAS NOW. PATIENT STATED HER MAIN AREA ON PAIN IS HER SHOULDER BLADE AREA BUT SHE IS HAVING HEADACHES, NECK PAIN AND LUMBAR PAIN AS WELL. PATIENT IS REQUESTING A SOONER APPT WITH DR. LUCIA BECAUSE OF THE PAIN HAS BEEN HARD TO WORK WITH. IN ADDITION, PATIENT WAS WANTING CLINICAL ADVICE ON ALTERNATING BETWEEN ALEVE AND TYLENOL EXTRA STRENGTH TO HELP WITH THE PAIN. PATIENT STATED A FRIEND HAD TOLD HER ABOUT DOING THIS FOR PAIN BUT SHE WANTED TO ASK DR. LUCIA ABOUT ALTERNATING BETWEEN THE TWO OVER THE COUNTER MEDICATIONS PRIOR TO DOING THAT. PATIENT STATED SHE WANTED TO KNOW HOW OFTEN TO TAKE BOTH MEDICATIONS IF SHE WAS TO ALTERNATE THE TWO. PLEASE ADVISE.       5

## 2024-02-13 NOTE — H&P PST ADULT - ASSESSMENT
37 y/o female presents to PST for scheduled left oophorectomy and exploratory laparotomy on 2/21/24 37 y/o female presents to PST for scheduled left oophorectomy and exploratory laparotomy on 24.    CAPRINI SCORE    AGE RELATED RISK FACTORS                                                       MOBILITY RELATED FACTORS  [ ] Age 41-60 years                                            (1 Point)                  [ ] Bed rest                                                        (1 Point)  [ ] Age: 61-74 years                                           (2 Points)                [ ] Plaster cast                                                   (2 Points)  [ ] Age= 75 years                                              (3 Points)                 [ ] Bed bound for more than 72 hours                   (2 Points)    DISEASE RELATED RISK FACTORS                                               GENDER SPECIFIC FACTORS  [ ] Edema in the lower extremities                       (1 Point)                  [ ] Pregnancy                                                     (1 Point)  [ ] Varicose veins                                               (1 Point)                  [ ] Post-partum < 6 weeks                                   (1 Point)             [x ] BMI > 25 Kg/m2                                            (1 Point)                  [ ] Hormonal therapy  or oral contraception            (1 Point)                 [ ] Sepsis (in the previous month)                        (1 Point)                  [ ] History of pregnancy complications  [ ] Pneumonia or serious lung disease                                               [ ] Unexplained or recurrent                       (1 Point)           (in the previous month)                               (1 Point)  [ ] Abnormal pulmonary function test                     (1 Point)                 SURGERY RELATED RISK FACTORS  [ ] Acute myocardial infarction                              (1 Point)                 [ ]  Section                                            (1 Point)  [ ] Congestive heart failure (in the previous month)  (1 Point)                 [ ] Minor surgery                                                 (1 Point)   [ ] Inflammatory bowel disease                             (1 Point)                 [ ] Arthroscopic surgery                                        (2 Points)  [ ] Central venous access                                    (2 Points)                [x ] General surgery lasting more than 45 minutes   (2 Points)       [ ] Stroke (in the previous month)                          (5 Points)               [ ] Elective arthroplasty                                        (5 Points)            [ ] malignancy  present or previous                        (2 points)                                                                                                                                 HEMATOLOGY RELATED FACTORS                                                 TRAUMA RELATED RISK FACTORS  [ ] Prior episodes of VTE                                     (3 Points)                 [ ] Fracture of the hip, pelvis, or leg                       (5 Points)  [ ] Positive family history for VTE                         (3 Points)                 [ ] Acute spinal cord injury (in the previous month)  (5 Points)  [ ] Prothrombin 04128 A                                      (3 Points)                 [ ] Paralysis  (less than 1 month)                          (5 Points)  [ ] Factor V Leiden                                             (3 Points)                 [ ] Multiple Trauma within 1 month                         (5 Points)  [ ] Lupus anticoagulants                                     (3 Points)                                                           [ ] Anticardiolipin antibodies                                (3 Points)                                                       [ ] High homocysteine in the blood                      (3 Points)                                             [ ] Other congenital or acquired thrombophilia       (3 Points)                                                [ ] Heparin induced thrombocytopenia                  (3 Points)                                          Total Score [   3       ]  The Caprini score indicates this patient is at risk for a VTE event (score 3-5).  Most surgical patients in this group would benefit from pharmacologic prophylaxis.  The surgical team will determine the balance between VTE risk and bleeding risk

## 2024-02-13 NOTE — H&P PST ADULT - HISTORY OF PRESENT ILLNESS
37 y/o female presents to PST for scheduled left oophorectomy and exploratory laparotomy on 2/21/24. Patient reports she was seen at [facility] in 1/2024 due to pelvic pain. Ct abdomen done and large mass noted and attached to left ovary referred to surgeon.

## 2024-02-14 DIAGNOSIS — R19.00 INTRA-ABDOMINAL AND PELVIC SWELLING, MASS AND LUMP, UNSPECIFIED SITE: ICD-10-CM

## 2024-02-14 DIAGNOSIS — Z01.818 ENCOUNTER FOR OTHER PREPROCEDURAL EXAMINATION: ICD-10-CM

## 2024-02-14 PROBLEM — Z87.42 PERSONAL HISTORY OF OTHER DISEASES OF THE FEMALE GENITAL TRACT: Chronic | Status: ACTIVE | Noted: 2024-02-13

## 2024-02-14 PROBLEM — R19.09 OTHER INTRA-ABDOMINAL AND PELVIC SWELLING, MASS AND LUMP: Chronic | Status: ACTIVE | Noted: 2024-02-13

## 2024-02-15 ENCOUNTER — NON-APPOINTMENT (OUTPATIENT)
Age: 39
End: 2024-02-15

## 2024-02-15 ENCOUNTER — APPOINTMENT (OUTPATIENT)
Dept: FAMILY MEDICINE | Facility: CLINIC | Age: 39
End: 2024-02-15
Payer: COMMERCIAL

## 2024-02-15 VITALS
HEART RATE: 85 BPM | OXYGEN SATURATION: 98 % | WEIGHT: 220 LBS | SYSTOLIC BLOOD PRESSURE: 124 MMHG | HEIGHT: 64 IN | DIASTOLIC BLOOD PRESSURE: 84 MMHG | BODY MASS INDEX: 37.56 KG/M2

## 2024-02-15 DIAGNOSIS — Z01.818 ENCOUNTER FOR OTHER PREPROCEDURAL EXAMINATION: ICD-10-CM

## 2024-02-15 PROCEDURE — 93000 ELECTROCARDIOGRAM COMPLETE: CPT

## 2024-02-15 PROCEDURE — 99214 OFFICE O/P EST MOD 30 MIN: CPT

## 2024-02-15 NOTE — HISTORY OF PRESENT ILLNESS
[No Pertinent Cardiac History] : no history of aortic stenosis, atrial fibrillation, coronary artery disease, recent myocardial infarction, or implantable device/pacemaker [No Pertinent Pulmonary History] : no history of asthma, COPD, sleep apnea, or smoking [No Adverse Anesthesia Reaction] : no adverse anesthesia reaction in self or family member [(Patient denies any chest pain, claudication, dyspnea on exertion, orthopnea, palpitations or syncope)] : Patient denies any chest pain, claudication, dyspnea on exertion, orthopnea, palpitations or syncope [Excellent (>10 METs)] : Excellent (>10 METs) [Chronic Anticoagulation] : no chronic anticoagulation [Chronic Kidney Disease] : no chronic kidney disease [Diabetes] : no diabetes [FreeTextEntry1] : Left ovary removal [FreeTextEntry2] : 2/21/2024 [FreeTextEntry3] : Dr. Nikolay Leach [FreeTextEntry4] : Presenting in office for pre operative examination for left ovary removal. She was found to have abnormal mass on left ovary.

## 2024-02-15 NOTE — ASSESSMENT
[Continue anticoagulant treatment as is] : Continue current anticoagulant treatment [Continue anti-platelet treatment as is] : Continue current anti-platelet treatment [Continue medications as is] : Continue current medications [Patient Optimized for Surgery] : Patient optimized for surgery

## 2024-02-21 ENCOUNTER — RESULT REVIEW (OUTPATIENT)
Age: 39
End: 2024-02-21

## 2024-02-21 ENCOUNTER — INPATIENT (INPATIENT)
Facility: HOSPITAL | Age: 39
LOS: 1 days | Discharge: HOME | DRG: 737 | End: 2024-02-23
Attending: OBSTETRICS & GYNECOLOGY | Admitting: OBSTETRICS & GYNECOLOGY
Payer: COMMERCIAL

## 2024-02-21 DIAGNOSIS — D39.0 NEOPLASM OF UNCERTAIN BEHAVIOR OF UTERUS: ICD-10-CM

## 2024-02-21 DIAGNOSIS — N85.8 OTHER SPECIFIED NONINFLAMMATORY DISORDERS OF UTERUS: ICD-10-CM

## 2024-02-21 DIAGNOSIS — N83.8 OTHER NONINFLAMMATORY DISORDERS OF OVARY, FALLOPIAN TUBE AND BROAD LIGAMENT: ICD-10-CM

## 2024-02-21 DIAGNOSIS — R19.09 OTHER INTRA-ABDOMINAL AND PELVIC SWELLING, MASS AND LUMP: ICD-10-CM

## 2024-02-21 DIAGNOSIS — D39.11 NEOPLASM OF UNCERTAIN BEHAVIOR OF RIGHT OVARY: ICD-10-CM

## 2024-02-21 DIAGNOSIS — Z90.89 ACQUIRED ABSENCE OF OTHER ORGANS: Chronic | ICD-10-CM

## 2024-02-21 DIAGNOSIS — R19.00 INTRA-ABDOMINAL AND PELVIC SWELLING, MASS AND LUMP, UNSPECIFIED SITE: ICD-10-CM

## 2024-02-21 DIAGNOSIS — Z87.42 PERSONAL HISTORY OF OTHER DISEASES OF THE FEMALE GENITAL TRACT: ICD-10-CM

## 2024-02-21 DIAGNOSIS — Z90.89 ACQUIRED ABSENCE OF OTHER ORGANS: ICD-10-CM

## 2024-02-21 DIAGNOSIS — R18.8 OTHER ASCITES: ICD-10-CM

## 2024-02-21 DIAGNOSIS — D39.12 NEOPLASM OF UNCERTAIN BEHAVIOR OF LEFT OVARY: ICD-10-CM

## 2024-02-21 LAB
GLUCOSE BLDC GLUCOMTR-MCNC: 133 MG/DL — HIGH (ref 70–99)
GLUCOSE BLDC GLUCOMTR-MCNC: 150 MG/DL — HIGH (ref 70–99)
HCG UR QL: NEGATIVE — SIGNIFICANT CHANGE UP

## 2024-02-21 PROCEDURE — 88331 PATH CONSLTJ SURG 1 BLK 1SPC: CPT

## 2024-02-21 PROCEDURE — 36415 COLL VENOUS BLD VENIPUNCTURE: CPT

## 2024-02-21 PROCEDURE — 88104 CYTOPATH FL NONGYN SMEARS: CPT | Mod: 26

## 2024-02-21 PROCEDURE — 88305 TISSUE EXAM BY PATHOLOGIST: CPT

## 2024-02-21 PROCEDURE — 88307 TISSUE EXAM BY PATHOLOGIST: CPT | Mod: 26

## 2024-02-21 PROCEDURE — 85025 COMPLETE CBC W/AUTO DIFF WBC: CPT

## 2024-02-21 PROCEDURE — 88104 CYTOPATH FL NONGYN SMEARS: CPT

## 2024-02-21 PROCEDURE — 58720 REMOVAL OF OVARY/TUBE(S): CPT | Mod: 80

## 2024-02-21 PROCEDURE — 83735 ASSAY OF MAGNESIUM: CPT

## 2024-02-21 PROCEDURE — 58720 REMOVAL OF OVARY/TUBE(S): CPT

## 2024-02-21 PROCEDURE — 80048 BASIC METABOLIC PNL TOTAL CA: CPT

## 2024-02-21 PROCEDURE — 88307 TISSUE EXAM BY PATHOLOGIST: CPT

## 2024-02-21 PROCEDURE — 58920 PARTIAL REMOVAL OF OVARY(S): CPT | Mod: 80

## 2024-02-21 PROCEDURE — 88331 PATH CONSLTJ SURG 1 BLK 1SPC: CPT | Mod: 26

## 2024-02-21 PROCEDURE — 88305 TISSUE EXAM BY PATHOLOGIST: CPT | Mod: 26

## 2024-02-21 PROCEDURE — 84100 ASSAY OF PHOSPHORUS: CPT

## 2024-02-21 PROCEDURE — 82962 GLUCOSE BLOOD TEST: CPT

## 2024-02-21 PROCEDURE — 81025 URINE PREGNANCY TEST: CPT

## 2024-02-21 PROCEDURE — 58920 PARTIAL REMOVAL OF OVARY(S): CPT

## 2024-02-21 RX ORDER — HEPARIN SODIUM 5000 [USP'U]/ML
5000 INJECTION INTRAVENOUS; SUBCUTANEOUS ONCE
Refills: 0 | Status: COMPLETED | OUTPATIENT
Start: 2024-02-21 | End: 2024-02-21

## 2024-02-21 RX ORDER — CEFAZOLIN SODIUM 1 G
2000 VIAL (EA) INJECTION ONCE
Refills: 0 | Status: COMPLETED | OUTPATIENT
Start: 2024-02-21 | End: 2024-02-21

## 2024-02-21 RX ORDER — ONDANSETRON 8 MG/1
4 TABLET, FILM COATED ORAL ONCE
Refills: 0 | Status: DISCONTINUED | OUTPATIENT
Start: 2024-02-21 | End: 2024-02-21

## 2024-02-21 RX ORDER — INFLUENZA VIRUS VACCINE 15; 15; 15; 15 UG/.5ML; UG/.5ML; UG/.5ML; UG/.5ML
0.5 SUSPENSION INTRAMUSCULAR ONCE
Refills: 0 | Status: COMPLETED | OUTPATIENT
Start: 2024-02-21 | End: 2024-02-21

## 2024-02-21 RX ORDER — OXYCODONE HYDROCHLORIDE 5 MG/1
5 TABLET ORAL ONCE
Refills: 0 | Status: DISCONTINUED | OUTPATIENT
Start: 2024-02-21 | End: 2024-02-23

## 2024-02-21 RX ORDER — SODIUM CHLORIDE 9 MG/ML
1000 INJECTION, SOLUTION INTRAVENOUS
Refills: 0 | Status: DISCONTINUED | OUTPATIENT
Start: 2024-02-21 | End: 2024-02-23

## 2024-02-21 RX ORDER — HYDROMORPHONE HYDROCHLORIDE 2 MG/ML
0.5 INJECTION INTRAMUSCULAR; INTRAVENOUS; SUBCUTANEOUS
Refills: 0 | Status: DISCONTINUED | OUTPATIENT
Start: 2024-02-21 | End: 2024-02-21

## 2024-02-21 RX ORDER — IBUPROFEN 200 MG
600 TABLET ORAL EVERY 6 HOURS
Refills: 0 | Status: DISCONTINUED | OUTPATIENT
Start: 2024-02-21 | End: 2024-02-23

## 2024-02-21 RX ORDER — HEPARIN SODIUM 5000 [USP'U]/ML
5000 INJECTION INTRAVENOUS; SUBCUTANEOUS EVERY 8 HOURS
Refills: 0 | Status: DISCONTINUED | OUTPATIENT
Start: 2024-02-21 | End: 2024-02-23

## 2024-02-21 RX ORDER — OXYCODONE HYDROCHLORIDE 5 MG/1
5 TABLET ORAL
Refills: 0 | Status: DISCONTINUED | OUTPATIENT
Start: 2024-02-21 | End: 2024-02-23

## 2024-02-21 RX ORDER — ACETAMINOPHEN 500 MG
1000 TABLET ORAL ONCE
Refills: 0 | Status: COMPLETED | OUTPATIENT
Start: 2024-02-21 | End: 2024-02-21

## 2024-02-21 RX ORDER — GABAPENTIN 400 MG/1
600 CAPSULE ORAL ONCE
Refills: 0 | Status: COMPLETED | OUTPATIENT
Start: 2024-02-21 | End: 2024-02-21

## 2024-02-21 RX ORDER — SODIUM CHLORIDE 9 MG/ML
1000 INJECTION, SOLUTION INTRAVENOUS
Refills: 0 | Status: DISCONTINUED | OUTPATIENT
Start: 2024-02-21 | End: 2024-02-21

## 2024-02-21 RX ORDER — KETOROLAC TROMETHAMINE 30 MG/ML
30 SYRINGE (ML) INJECTION EVERY 6 HOURS
Refills: 0 | Status: DISCONTINUED | OUTPATIENT
Start: 2024-02-21 | End: 2024-02-22

## 2024-02-21 RX ORDER — ACETAMINOPHEN 500 MG
975 TABLET ORAL
Refills: 0 | Status: DISCONTINUED | OUTPATIENT
Start: 2024-02-21 | End: 2024-02-23

## 2024-02-21 RX ORDER — ONDANSETRON 8 MG/1
4 TABLET, FILM COATED ORAL EVERY 6 HOURS
Refills: 0 | Status: DISCONTINUED | OUTPATIENT
Start: 2024-02-21 | End: 2024-02-23

## 2024-02-21 RX ORDER — CEFAZOLIN SODIUM 1 G
2000 VIAL (EA) INJECTION ONCE
Refills: 0 | Status: DISCONTINUED | OUTPATIENT
Start: 2024-02-21 | End: 2024-02-21

## 2024-02-21 RX ORDER — CELECOXIB 200 MG/1
400 CAPSULE ORAL ONCE
Refills: 0 | Status: COMPLETED | OUTPATIENT
Start: 2024-02-21 | End: 2024-02-21

## 2024-02-21 RX ADMIN — Medication 975 MILLIGRAM(S): at 21:22

## 2024-02-21 RX ADMIN — Medication 2000 MILLIGRAM(S): at 17:12

## 2024-02-21 RX ADMIN — HEPARIN SODIUM 5000 UNIT(S): 5000 INJECTION INTRAVENOUS; SUBCUTANEOUS at 10:15

## 2024-02-21 RX ADMIN — GABAPENTIN 600 MILLIGRAM(S): 400 CAPSULE ORAL at 10:15

## 2024-02-21 RX ADMIN — Medication 30 MILLIGRAM(S): at 23:19

## 2024-02-21 RX ADMIN — CELECOXIB 400 MILLIGRAM(S): 200 CAPSULE ORAL at 10:15

## 2024-02-21 RX ADMIN — HEPARIN SODIUM 5000 UNIT(S): 5000 INJECTION INTRAVENOUS; SUBCUTANEOUS at 21:33

## 2024-02-21 RX ADMIN — Medication 30 MILLIGRAM(S): at 23:49

## 2024-02-21 RX ADMIN — HYDROMORPHONE HYDROCHLORIDE 0.5 MILLIGRAM(S): 2 INJECTION INTRAMUSCULAR; INTRAVENOUS; SUBCUTANEOUS at 14:00

## 2024-02-21 RX ADMIN — HYDROMORPHONE HYDROCHLORIDE 0.5 MILLIGRAM(S): 2 INJECTION INTRAMUSCULAR; INTRAVENOUS; SUBCUTANEOUS at 13:46

## 2024-02-21 RX ADMIN — SODIUM CHLORIDE 125 MILLILITER(S): 9 INJECTION, SOLUTION INTRAVENOUS at 13:49

## 2024-02-21 RX ADMIN — Medication 30 MILLIGRAM(S): at 17:12

## 2024-02-21 RX ADMIN — Medication 1000 MILLIGRAM(S): at 10:14

## 2024-02-21 RX ADMIN — Medication 30 MILLIGRAM(S): at 17:18

## 2024-02-21 RX ADMIN — Medication 975 MILLIGRAM(S): at 22:03

## 2024-02-21 RX ADMIN — SODIUM CHLORIDE 125 MILLILITER(S): 9 INJECTION, SOLUTION INTRAVENOUS at 21:33

## 2024-02-21 NOTE — BRIEF OPERATIVE NOTE - SPECIMENS
1. left ovary and fallopian tube, 2. posterior uterine serosal implants, 3. right ovary wedge resection 1. pelvic washings, 2. left ovary and fallopian tube, 3. posterior uterine serosal implants, 4. right ovary wedge resection

## 2024-02-21 NOTE — BRIEF OPERATIVE NOTE - OPERATION/FINDINGS
Normal external genitalia, mobile 16cm mass. Complex left ovarian cyst, with irregular distal tube sent for intra operative frozen section that revealed 2 areas of possible borderline characteristics however no overt malignancy.  Right ovary with irregular < 1cm segment resected wedge.  Grossly normal appendix, omentum, mesentery and small bowel clear of lesions. Posterior uterine serosa with multiple areas of small implants resected for pathology. Hemostasis appreciated at end of procedure Normal external genitalia, mobile 16cm mass. Complex left ovarian cyst, with irregular distal tube excised and removed intact with out spillage sent for intra operative frozen section that revealed 2 areas of possible borderline characteristics however no overt malignancy.  Right ovary with irregular < 1cm segment resected wedge.  Grossly normal appendix, omentum, mesentery and small bowel clear of lesions. Posterior uterine serosa with multiple areas of small implants resected for pathology. Hemostasis appreciated at end of procedure

## 2024-02-22 LAB
ANION GAP SERPL CALC-SCNC: 4 MMOL/L — LOW (ref 5–17)
BASOPHILS # BLD AUTO: 0.02 K/UL — SIGNIFICANT CHANGE UP (ref 0–0.2)
BASOPHILS NFR BLD AUTO: 0.2 % — SIGNIFICANT CHANGE UP (ref 0–2)
BUN SERPL-MCNC: 9 MG/DL — SIGNIFICANT CHANGE UP (ref 7–23)
CALCIUM SERPL-MCNC: 8.6 MG/DL — SIGNIFICANT CHANGE UP (ref 8.5–10.1)
CHLORIDE SERPL-SCNC: 110 MMOL/L — HIGH (ref 96–108)
CO2 SERPL-SCNC: 26 MMOL/L — SIGNIFICANT CHANGE UP (ref 22–31)
CREAT SERPL-MCNC: 0.62 MG/DL — SIGNIFICANT CHANGE UP (ref 0.5–1.3)
EGFR: 118 ML/MIN/1.73M2 — SIGNIFICANT CHANGE UP
EOSINOPHIL # BLD AUTO: 0.02 K/UL — SIGNIFICANT CHANGE UP (ref 0–0.5)
EOSINOPHIL NFR BLD AUTO: 0.2 % — SIGNIFICANT CHANGE UP (ref 0–6)
GLUCOSE BLDC GLUCOMTR-MCNC: 107 MG/DL — HIGH (ref 70–99)
GLUCOSE BLDC GLUCOMTR-MCNC: 107 MG/DL — HIGH (ref 70–99)
GLUCOSE SERPL-MCNC: 100 MG/DL — HIGH (ref 70–99)
HCT VFR BLD CALC: 32.8 % — LOW (ref 34.5–45)
HGB BLD-MCNC: 11 G/DL — LOW (ref 11.5–15.5)
IMM GRANULOCYTES NFR BLD AUTO: 0.3 % — SIGNIFICANT CHANGE UP (ref 0–0.9)
LYMPHOCYTES # BLD AUTO: 19.3 % — SIGNIFICANT CHANGE UP (ref 13–44)
LYMPHOCYTES # BLD AUTO: 2.01 K/UL — SIGNIFICANT CHANGE UP (ref 1–3.3)
MAGNESIUM SERPL-MCNC: 1.8 MG/DL — SIGNIFICANT CHANGE UP (ref 1.6–2.6)
MCHC RBC-ENTMCNC: 30.2 PG — SIGNIFICANT CHANGE UP (ref 27–34)
MCHC RBC-ENTMCNC: 33.5 GM/DL — SIGNIFICANT CHANGE UP (ref 32–36)
MCV RBC AUTO: 90.1 FL — SIGNIFICANT CHANGE UP (ref 80–100)
MONOCYTES # BLD AUTO: 0.66 K/UL — SIGNIFICANT CHANGE UP (ref 0–0.9)
MONOCYTES NFR BLD AUTO: 6.3 % — SIGNIFICANT CHANGE UP (ref 2–14)
NEUTROPHILS # BLD AUTO: 7.69 K/UL — HIGH (ref 1.8–7.4)
NEUTROPHILS NFR BLD AUTO: 73.7 % — SIGNIFICANT CHANGE UP (ref 43–77)
PHOSPHATE SERPL-MCNC: 3.4 MG/DL — SIGNIFICANT CHANGE UP (ref 2.5–4.5)
PLATELET # BLD AUTO: 356 K/UL — SIGNIFICANT CHANGE UP (ref 150–400)
POTASSIUM SERPL-MCNC: 3.1 MMOL/L — LOW (ref 3.5–5.3)
POTASSIUM SERPL-SCNC: 3.1 MMOL/L — LOW (ref 3.5–5.3)
RBC # BLD: 3.64 M/UL — LOW (ref 3.8–5.2)
RBC # FLD: 12.4 % — SIGNIFICANT CHANGE UP (ref 10.3–14.5)
SODIUM SERPL-SCNC: 140 MMOL/L — SIGNIFICANT CHANGE UP (ref 135–145)
WBC # BLD: 10.43 K/UL — SIGNIFICANT CHANGE UP (ref 3.8–10.5)
WBC # FLD AUTO: 10.43 K/UL — SIGNIFICANT CHANGE UP (ref 3.8–10.5)

## 2024-02-22 RX ORDER — POTASSIUM CHLORIDE 20 MEQ
20 PACKET (EA) ORAL
Refills: 0 | Status: COMPLETED | OUTPATIENT
Start: 2024-02-22 | End: 2024-02-22

## 2024-02-22 RX ORDER — MAGNESIUM SULFATE 500 MG/ML
1 VIAL (ML) INJECTION ONCE
Refills: 0 | Status: COMPLETED | OUTPATIENT
Start: 2024-02-22 | End: 2024-02-22

## 2024-02-22 RX ADMIN — Medication 975 MILLIGRAM(S): at 09:53

## 2024-02-22 RX ADMIN — Medication 20 MILLIEQUIVALENT(S): at 16:47

## 2024-02-22 RX ADMIN — Medication 100 GRAM(S): at 13:48

## 2024-02-22 RX ADMIN — Medication 975 MILLIGRAM(S): at 16:46

## 2024-02-22 RX ADMIN — Medication 30 MILLIGRAM(S): at 11:20

## 2024-02-22 RX ADMIN — Medication 20 MILLIEQUIVALENT(S): at 13:49

## 2024-02-22 RX ADMIN — HEPARIN SODIUM 5000 UNIT(S): 5000 INJECTION INTRAVENOUS; SUBCUTANEOUS at 13:49

## 2024-02-22 RX ADMIN — Medication 975 MILLIGRAM(S): at 20:56

## 2024-02-22 RX ADMIN — HEPARIN SODIUM 5000 UNIT(S): 5000 INJECTION INTRAVENOUS; SUBCUTANEOUS at 20:57

## 2024-02-22 RX ADMIN — SODIUM CHLORIDE 125 MILLILITER(S): 9 INJECTION, SOLUTION INTRAVENOUS at 05:46

## 2024-02-22 RX ADMIN — Medication 975 MILLIGRAM(S): at 04:27

## 2024-02-22 RX ADMIN — SODIUM CHLORIDE 125 MILLILITER(S): 9 INJECTION, SOLUTION INTRAVENOUS at 20:57

## 2024-02-22 RX ADMIN — Medication 30 MILLIGRAM(S): at 06:53

## 2024-02-22 RX ADMIN — Medication 975 MILLIGRAM(S): at 05:10

## 2024-02-22 RX ADMIN — Medication 30 MILLIGRAM(S): at 05:47

## 2024-02-22 RX ADMIN — Medication 20 MILLIEQUIVALENT(S): at 11:20

## 2024-02-22 RX ADMIN — HEPARIN SODIUM 5000 UNIT(S): 5000 INJECTION INTRAVENOUS; SUBCUTANEOUS at 05:46

## 2024-02-22 NOTE — PROGRESS NOTE ADULT - ATTENDING COMMENTS
Attending Attestation    Ms. Cordova is a 39 yo now POD#1 s/p Ex-LAP, LSO with IOFS demonstrating borderline features. Pt overall doing well and meeting early post-operative milestones. Pt well controlled. Tolerating PO, awaiting passage of flatus. Licea remained in place this AM. HDS. PE: abdomen soft, mild TTP, mild tympany, no rigidity/rebound/guarding; midline vertical incision with wound vac in place. Overall doing well. Will d/c licea and monitor void. Consider discharge as pt progresses towards post-operative milestones.     Nubia Wright MD  Gynecologic Oncology  2/22/24

## 2024-02-23 ENCOUNTER — TRANSCRIPTION ENCOUNTER (OUTPATIENT)
Age: 39
End: 2024-02-23

## 2024-02-23 VITALS
SYSTOLIC BLOOD PRESSURE: 135 MMHG | TEMPERATURE: 98 F | OXYGEN SATURATION: 97 % | DIASTOLIC BLOOD PRESSURE: 92 MMHG | RESPIRATION RATE: 16 BRPM | HEART RATE: 89 BPM

## 2024-02-23 LAB
ANION GAP SERPL CALC-SCNC: 2 MMOL/L — LOW (ref 5–17)
BASOPHILS # BLD AUTO: 0.05 K/UL — SIGNIFICANT CHANGE UP (ref 0–0.2)
BASOPHILS NFR BLD AUTO: 0.7 % — SIGNIFICANT CHANGE UP (ref 0–2)
BUN SERPL-MCNC: 9 MG/DL — SIGNIFICANT CHANGE UP (ref 7–23)
CALCIUM SERPL-MCNC: 8.8 MG/DL — SIGNIFICANT CHANGE UP (ref 8.5–10.1)
CHLORIDE SERPL-SCNC: 110 MMOL/L — HIGH (ref 96–108)
CO2 SERPL-SCNC: 28 MMOL/L — SIGNIFICANT CHANGE UP (ref 22–31)
CREAT SERPL-MCNC: 0.53 MG/DL — SIGNIFICANT CHANGE UP (ref 0.5–1.3)
EGFR: 123 ML/MIN/1.73M2 — SIGNIFICANT CHANGE UP
EOSINOPHIL # BLD AUTO: 0.1 K/UL — SIGNIFICANT CHANGE UP (ref 0–0.5)
EOSINOPHIL NFR BLD AUTO: 1.3 % — SIGNIFICANT CHANGE UP (ref 0–6)
GLUCOSE BLDC GLUCOMTR-MCNC: 105 MG/DL — HIGH (ref 70–99)
GLUCOSE SERPL-MCNC: 100 MG/DL — HIGH (ref 70–99)
HCT VFR BLD CALC: 34.2 % — LOW (ref 34.5–45)
HGB BLD-MCNC: 11 G/DL — LOW (ref 11.5–15.5)
IMM GRANULOCYTES NFR BLD AUTO: 0.4 % — SIGNIFICANT CHANGE UP (ref 0–0.9)
LYMPHOCYTES # BLD AUTO: 1.8 K/UL — SIGNIFICANT CHANGE UP (ref 1–3.3)
LYMPHOCYTES # BLD AUTO: 23.4 % — SIGNIFICANT CHANGE UP (ref 13–44)
MAGNESIUM SERPL-MCNC: 2 MG/DL — SIGNIFICANT CHANGE UP (ref 1.6–2.6)
MCHC RBC-ENTMCNC: 29.6 PG — SIGNIFICANT CHANGE UP (ref 27–34)
MCHC RBC-ENTMCNC: 32.2 GM/DL — SIGNIFICANT CHANGE UP (ref 32–36)
MCV RBC AUTO: 91.9 FL — SIGNIFICANT CHANGE UP (ref 80–100)
MONOCYTES # BLD AUTO: 0.61 K/UL — SIGNIFICANT CHANGE UP (ref 0–0.9)
MONOCYTES NFR BLD AUTO: 7.9 % — SIGNIFICANT CHANGE UP (ref 2–14)
NEUTROPHILS # BLD AUTO: 5.1 K/UL — SIGNIFICANT CHANGE UP (ref 1.8–7.4)
NEUTROPHILS NFR BLD AUTO: 66.3 % — SIGNIFICANT CHANGE UP (ref 43–77)
PHOSPHATE SERPL-MCNC: 2.7 MG/DL — SIGNIFICANT CHANGE UP (ref 2.5–4.5)
PLATELET # BLD AUTO: 325 K/UL — SIGNIFICANT CHANGE UP (ref 150–400)
POTASSIUM SERPL-MCNC: 3.8 MMOL/L — SIGNIFICANT CHANGE UP (ref 3.5–5.3)
POTASSIUM SERPL-SCNC: 3.8 MMOL/L — SIGNIFICANT CHANGE UP (ref 3.5–5.3)
RBC # BLD: 3.72 M/UL — LOW (ref 3.8–5.2)
RBC # FLD: 12.6 % — SIGNIFICANT CHANGE UP (ref 10.3–14.5)
SODIUM SERPL-SCNC: 140 MMOL/L — SIGNIFICANT CHANGE UP (ref 135–145)
WBC # BLD: 7.69 K/UL — SIGNIFICANT CHANGE UP (ref 3.8–10.5)
WBC # FLD AUTO: 7.69 K/UL — SIGNIFICANT CHANGE UP (ref 3.8–10.5)

## 2024-02-23 RX ORDER — ACETAMINOPHEN 500 MG
3 TABLET ORAL
Qty: 0 | Refills: 0 | DISCHARGE
Start: 2024-02-23

## 2024-02-23 RX ORDER — OXYCODONE HYDROCHLORIDE 5 MG/1
1 TABLET ORAL
Qty: 6 | Refills: 0
Start: 2024-02-23 | End: 2024-02-24

## 2024-02-23 RX ADMIN — Medication 975 MILLIGRAM(S): at 05:58

## 2024-02-23 RX ADMIN — Medication 975 MILLIGRAM(S): at 13:17

## 2024-02-23 RX ADMIN — HEPARIN SODIUM 5000 UNIT(S): 5000 INJECTION INTRAVENOUS; SUBCUTANEOUS at 05:59

## 2024-02-23 RX ADMIN — Medication 975 MILLIGRAM(S): at 12:43

## 2024-02-23 NOTE — DISCHARGE NOTE NURSING/CASE MANAGEMENT/SOCIAL WORK - NSDCPEFALRISK_GEN_ALL_CORE
For information on Fall & Injury Prevention, visit: [email or web address] OR  [email or web address] OR  https://www.cdc.gov/steadi/patient.html

## 2024-02-23 NOTE — DISCHARGE NOTE PROVIDER - NSDCMRMEDTOKEN_GEN_ALL_CORE_FT
acetaminophen 325 mg oral tablet: 3 tab(s) orally every 6 hours  Multiple Vitamins oral tablet: 1 tab(s) orally once a day  naproxen 500 mg oral tablet: 1 tab(s) orally 2 times a day  oxyCODONE 5 mg oral tablet: 1 tab(s) orally every 8 hours as needed for  severe pain MDD: 3 tabs

## 2024-02-23 NOTE — PROGRESS NOTE ADULT - ATTENDING COMMENTS
Fellow Attestation: Patient seen and examined at bedside. Overall feels well and voices no complaints. Tolerating PO, voiding and x1 BM today. Labs stable and VSS. Bandage removed and incision c/d/i with staples in place. Plan home precautions reviewed and plan to be dc home with follow up in 2 weeks. d/w Dr. Link

## 2024-02-23 NOTE — DISCHARGE NOTE PROVIDER - NSDCFUADDAPPT_GEN_ALL_CORE_FT
A PA will call you in 1 week to follow up how you are recovering.   Follow-up with Dr. Link in two weeks to review pathology report.     Please contact your provider for any pain uncontrolled by medication, excessive bleeding or Fever>100.4  Please take Naprosyn tablet every 12 hours x 7 days, may take Oxycodone as prescribed for breakthrough pain. Please take Tylenol 975mg or 1000mg every 6 hours as well as needed for pain control (do not take more than 4G daily).

## 2024-02-23 NOTE — PROGRESS NOTE ADULT - SUBJECTIVE AND OBJECTIVE BOX
CAN YARBROUGH is a 38y now POD#1 s/p Ex-LAP, LSO with IOFS demonstrating borderline features.    S:    Patient has no complaints.   States pain is well controlled with current treatment regimen.   Denies nausea and vomiting. Tolerating PO diet.   Ambulating without difficulty.   Thomas catheter in place.   She denies lightheadedness, dizziness, palpitations, chest pain and SOB.     O:   Vital Signs Last 24 Hrs  T(C): 36.8 (22 Feb 2024 08:20), Max: 37.1 (22 Feb 2024 00:10)  T(F): 98.2 (22 Feb 2024 08:20), Max: 98.8 (22 Feb 2024 00:10)  HR: 63 (22 Feb 2024 08:20) (58 - 98)  BP: 110/65 (22 Feb 2024 08:20) (102/58 - 137/96)  BP(mean): --  RR: 16 (22 Feb 2024 08:20) (11 - 16)  SpO2: 100% (22 Feb 2024 08:20) (95% - 100%)    Parameters below as of 22 Feb 2024 08:20  Patient On (Oxygen Delivery Method): room air        Gen: NAD, AAOx3  CV: RRR, S1/S2 present  Pulm: CTAB  Abdomen: Soft, nondistended, nontender, + BS  Incision: Clean, dry and intact; bottom edge marked with staining of blood; nonexpanding.     Pelvic: Pad inspected with no blood noted.   Extremities: No calf tenderness BL      I&O's Summary    21 Feb 2024 07:01  -  22 Feb 2024 07:00  --------------------------------------------------------  IN: 1200 mL / OUT: 1050 mL / NET: 150 mL    22 Feb 2024 07:01  -  22 Feb 2024 09:52  --------------------------------------------------------  IN: 0 mL / OUT: 275 mL / NET: -275 mL              
GYNECOLOGIC ONCOLOGY PROGRESS NOTE    CAN YARBROUGH is a 38y now POD#2 s/p Ex-LAP, LSO with IOFS demonstrating borderline features.    SUBJECTIVE:    Patient is without complaints.  Pain well-controlled.  Passing flatus, voiding spontaneously.  Denies Nausea, Vomiting or Diarrhea.  Denies shortness of breath, chest pain or dyspnea on exertion.  Tolerating diet.    OBJECTIVE:     VITALS:  T(F): 98.4 (02-23-24 @ 00:25), Max: 98.4 (02-23-24 @ 00:25)  HR: 89 (02-23-24 @ 00:25) (63 - 89)  BP: 120/73 (02-23-24 @ 00:25) (110/65 - 134/73)  RR: 16 (02-23-24 @ 00:25) (16 - 16)  SpO2: 98% (02-23-24 @ 00:25) (97% - 100%)    I&O's Summary    21 Feb 2024 07:01  -  22 Feb 2024 07:00  --------------------------------------------------------  IN: 1200 mL / OUT: 1050 mL / NET: 150 mL    22 Feb 2024 07:01  -  23 Feb 2024 05:57  --------------------------------------------------------  IN: 0 mL / OUT: 625 mL / NET: -625 mL        MEDICATIONS  (STANDING):  acetaminophen     Tablet .. 975 milliGRAM(s) Oral <User Schedule>  heparin   Injectable 5000 Unit(s) SubCutaneous every 8 hours  ibuprofen  Tablet. 600 milliGRAM(s) Oral every 6 hours  lactated ringers. 1000 milliLiter(s) (125 mL/Hr) IV Continuous <Continuous>    MEDICATIONS  (PRN):  ondansetron Injectable 4 milliGRAM(s) IV Push every 6 hours PRN Nausea and/or Vomiting  oxyCODONE    IR 5 milliGRAM(s) Oral every 3 hours PRN Moderate to Severe Pain (4-10)  oxyCODONE    IR 5 milliGRAM(s) Oral once PRN Moderate to Severe Pain (4-10)      Physical Exam:  Constitutional: NAD  Pulmonary: clear to auscultation bilaterally   Cardiovascular: Regular rate and rhythm   Abdomen: soft, non-tender, non-distended, normal bowel sounds  Extremities: no lower extremity edema or calve tenderness  Incision: Clean, dry, intact with mepilex dressing in place. Bottom edge marked with staining of blood; nonexpanding. Without signs of infection or hernia.    LABS:                        11.0   10.43 )-----------( 356      ( 22 Feb 2024 08:37 )             32.8     02-22    140  |  110<H>  |  9   ----------------------------<  100<H>  3.1<L>   |  26  |  0.62    Ca    8.6      22 Feb 2024 08:37  Phos  3.4     02-22  Mg     1.8     02-22        Urinalysis Basic - ( 22 Feb 2024 08:37 )    Color: x / Appearance: x / SG: x / pH: x  Gluc: 100 mg/dL / Ketone: x  / Bili: x / Urobili: x   Blood: x / Protein: x / Nitrite: x   Leuk Esterase: x / RBC: x / WBC x   Sq Epi: x / Non Sq Epi: x / Bacteria: x        
CAN YARBROUGH is a 38y now POD#0 s/p Ex-LAP, LSO with IOFS demonstrating borderline features.    S:    Patient has no complaints.   States pain is well controlled with current treatment regimen.   Denies nausea and vomiting Tolerating liquids. Has ordered dinner for tonight.   Has not yet ambulated.   Thomas catheter in place.   She denies lightheadedness, dizziness, palpitations, chest pain and SOB.     O:   T(C): 36.4 (02-21-24 @ 16:00), Max: 36.8 (02-21-24 @ 13:15)  HR: 97 (02-21-24 @ 16:00) (58 - 98)  BP: 112/70 (02-21-24 @ 16:00) (112/70 - 137/96)  RR: 15 (02-21-24 @ 16:00) (11 - 16)  SpO2: 95% (02-21-24 @ 16:00) (95% - 100%)    Gen: NAD, AAOx3  CV: RRR, S1/S2 present  Pulm: CTAB  Abdomen: Soft, nondistended, nontender, + BS  Incision: Clean, dry and intact; bottom edge noted to be peeling and reinforced with Tegaderm.    Pelvic: Pad inspected with no blood noted.   Extremities: No calf tenderness BL        02-21-24 @ 07:01  -  02-21-24 @ 17:50  --------------------------------------------------------  IN: 1200 mL / OUT: 300 mL / NET: 900 mL

## 2024-02-23 NOTE — DISCHARGE NOTE NURSING/CASE MANAGEMENT/SOCIAL WORK - PATIENT PORTAL LINK FT
Returned patients call doing ok, she has had diarrhea 2 times a day also having gas pain that will radiate into shoulders.drinking all fluids good. Feels good no pain just a full feeling.    Contacted Dr. Puente and he was ok with her doing gas x.  Patient will report back if she is having any other problems.    You can access the FollowMyHealth Patient Portal offered by [facility] by registering at the following website: [email or web address]. By joining [email or web address]’s FollowMyHealth portal, you will also be able to view your health information using other applications (apps) compatible with our system.

## 2024-02-23 NOTE — PROGRESS NOTE ADULT - ASSESSMENT
CAN YARBROUGH is a 38y now POD#1 s/p Ex-LAP, LSO with IOFS demonstrating borderline features.    A/P:   Neuro: Pain well controlled. Continue current pain regimen.  CV: No history of cardiovascular disease. Blood pressure well controlled.  Pulm: No active disease. Saturating well on room air. Incentive spirometer use encouraged  GI: No active disease. Bowel sounds normal, tolerating PO diet. Continue current bowel regimen.   : licea in place with adequate UOP. Plan to discontinue with TOV to follow.   Heme: Hgb 12.6 -> 11.0    ID: Afebrile. No antibiotics indicated at this time.   Endo: No active disease.   FEN: Will discontinue IVF as tolerating PO. Replete electrolytes prn.   Skin: No active disease.   Psych: No active disease.   DVT ppx: Ambulation encouraged, SCDs when in bed, Heparin ordered.  Dispo: continue inpt management     D/w Dr. Wright     
CAN YARBROUGH is a 38y now POD#2 s/p Ex-LAP, LSO with IOFS demonstrating borderline features.    A/P:   Neuro: Pain well controlled. Continue current pain regimen.  CV: No history of cardiovascular disease. Blood pressure well controlled.  Pulm: No active disease. Saturating well on room air. Incentive spirometer use encouraged  GI: No active disease. Bowel sounds normal, tolerating PO diet. Continue current bowel regimen.   : Thomas removed, voiding spontaneously.  Heme: Hgb 12.6 -> 11.0    ID: Afebrile. No antibiotics indicated at this time.   Endo: No active disease.   FEN: Tolerating PO diet Replete electrolytes prn.   Skin: No active disease.   Psych: No active disease.   DVT ppx: Ambulation encouraged, SCDs when in bed, Heparin ordered.  Dispo: continue inpatient management, consider discharge to home today
CAN YARBROUGH is a 38y now POD#0 s/p Ex-LAP, LSO with IOFS demonstrating borderline features.    A/P:   Neuro: Pain well controlled. Continue current pain regimen.  CV: No history of cardiovascular disease. Blood pressure well controlled.  Pulm: No active disease. Saturating well on room air. Incentive spirometer use encouraged  GI: No active disease. Bowel sounds normal, tolerating PO diet. Continue current bowel regimen.   : licea in place, plan to keep until AM.   Heme: Hgb 12.6 -> AM labs ordered   ID: Afebrile. No antibiotics indicated at this time.   Endo: No active disease.   FEN: IVF at 125 cc/hr. Will discontinue IVF when tolerating PO. AM BMP ordered.   Skin: No active disease.   Psych: No active disease.   DVT ppx: Ambulation encouraged, SCDs when in bed, Heparin ordered.  Dispo: continue inpt management     D/w Dr. Parry

## 2024-02-23 NOTE — DISCHARGE NOTE PROVIDER - CARE PROVIDER_API CALL
Hany Link  Gynecologic Oncology  [address] [place] [id number]  Phone: [phone]  Fax: [phone]  Follow Up Time:

## 2024-02-23 NOTE — DISCHARGE NOTE PROVIDER - HOSPITAL COURSE
Patient post-operatively had an uncomplicated hospital course. Her pain was well controlled. She is tolerating a regular diet. She is ambulating independently. She was able to void after removal of licea. Patient with flatus. Labs and Vitals WNL upon discharge.

## 2024-02-26 ENCOUNTER — NON-APPOINTMENT (OUTPATIENT)
Age: 39
End: 2024-02-26

## 2024-02-27 ENCOUNTER — NON-APPOINTMENT (OUTPATIENT)
Age: 39
End: 2024-02-27

## 2024-03-06 ENCOUNTER — APPOINTMENT (OUTPATIENT)
Dept: GYNECOLOGIC ONCOLOGY | Facility: CLINIC | Age: 39
End: 2024-03-06
Payer: COMMERCIAL

## 2024-03-06 VITALS — HEIGHT: 64 IN | WEIGHT: 217 LBS | TEMPERATURE: 97.9 F | BODY MASS INDEX: 37.05 KG/M2

## 2024-03-06 DIAGNOSIS — R19.00 INTRA-ABDOMINAL AND PELVIC SWELLING, MASS AND LUMP, UNSPECIFIED SITE: ICD-10-CM

## 2024-03-06 PROCEDURE — 99024 POSTOP FOLLOW-UP VISIT: CPT

## 2024-03-06 NOTE — REASON FOR VISIT
[de-identified] : 2/21/24 [de-identified] : E-lap, LSO, right ovarian wedge resection, peritoneal biopsies, PW. Pain is well controlled with Naprosyn. Denies fever, cp, sob, n/v or calf pain. Denies VB. She is ambulating independently. She has normal bowel and bladder function. She reports her incision is healing well with no redness, drainage or signs of infection. Pt is now here for a post op visit [Spouse] : spouse [de-identified] : Incisions healing well. No nausea/vomiting, no fevers/chills, no pain, no concerns and doing well.

## 2024-03-06 NOTE — END OF VISIT
[FreeTextEntry3] : Follow up on pathology via telehealth  Needs another 3 weeks of no heavy lifting or straining for recovery given large incision [FreeTextEntry2] : Dayana Sherman MA was present the entire duration of the patient interaction and gynecological exam.

## 2024-03-06 NOTE — ASSESSMENT
[FreeTextEntry1] : Pt is a 39 yo s/p ex-lap LSO and right ovarian wedge resection for a large left ovarian mass. Pathology pending. Recovering well and staples removed. Incision intact.

## 2024-03-06 NOTE — DISCUSSION/SUMMARY
[Clean] : was clean [Dry] : was dry [Intact] : was intact [Erythema] : was not erythematous [Ecchymosis] : was not ecchymotic [Seroma] : had no seroma [Staple Intact] : had staples intact [None] : had no drainage [Normal Skin] : normal appearance [Tender] : nontender [Firm] : soft [Abnormal Bowel Sounds] : normal bowel sounds [Rebound] : no rebound tenderness [Guarding] : no guarding [Incisional Hernia] : no incisional hernia [Mass] : no palpable mass [Doing Well] : is doing well [No Sign of Infection] : is showing no signs of infection [Excellent Pain Control] : has excellent pain control [FreeTextEntry1] : Pertinent Findings:  16 cm left complex ovarian mass with papillary tumor coming off the ovarian stroma 3-4 cm in size. Pelvis with 200 cc of ascites clear/yellow. Right ovary with 4 mm implant resected with wedge resection, smaller surface implants fulgurated. Posterior uterus serosa with 3-4 mm implants from fundus to the cul-de-sac resected. Normal upper abdomen, normal omentum and no implants on abdominal peritoneum. Frozen - at most borderline tumor.   ***PATHOLOGY PENDING***

## 2024-03-08 ENCOUNTER — APPOINTMENT (OUTPATIENT)
Dept: GYNECOLOGIC ONCOLOGY | Facility: CLINIC | Age: 39
End: 2024-03-08
Payer: COMMERCIAL

## 2024-03-13 LAB — SURGICAL PATHOLOGY STUDY: SIGNIFICANT CHANGE UP

## 2024-03-15 ENCOUNTER — NON-APPOINTMENT (OUTPATIENT)
Age: 39
End: 2024-03-15

## 2024-03-15 ENCOUNTER — APPOINTMENT (OUTPATIENT)
Dept: GYNECOLOGIC ONCOLOGY | Facility: CLINIC | Age: 39
End: 2024-03-15
Payer: COMMERCIAL

## 2024-03-15 DIAGNOSIS — D39.10 NEOPLASM OF UNCERTAIN BEHAVIOR OF UNSPECIFIED OVARY: ICD-10-CM

## 2024-03-15 PROCEDURE — 99024 POSTOP FOLLOW-UP VISIT: CPT

## 2024-03-15 NOTE — HISTORY OF PRESENT ILLNESS
[Home] : at home, [unfilled] , at the time of the visit. [Verbal consent obtained from patient] : the patient, [unfilled] [Other Location: e.g. Home (Enter Location, City,State)___] : at [unfilled] [FreeTextEntry1] : Pt is a 39 yo with a 16 cm left ovarian mass s/p ex-lap, LSO, right ovarian wedge resection and peritoneal biopsies. Pathology is now ready and she presents for review and further management recommendations. She reprots no new concerns. She is feeling well.   Indication: transportation issue, management discussion

## 2024-03-15 NOTE — ASSESSMENT
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 No evidence of invasive implants. We discussed that borderline tumors have a possibility of coming back and there is a 10% risk that these tumors represent early low grade serous cancer which can be life threatening. REcommendation is for no additional adjuvant treatment, but to keep a close clinical observation with tumor markers and imaging. We will get  every 3 months and pelvic US for the first 2 years. All questions answered.

## 2024-03-15 NOTE — END OF VISIT
[FreeTextEntry3] : RTC in 3 months to start surveillance Pre-clinic  and TVUS [Time Spent: ___ minutes] : I have spent [unfilled] minutes of time on the encounter.

## 2024-06-24 ENCOUNTER — RESULT REVIEW (OUTPATIENT)
Age: 39
End: 2024-06-24

## 2024-06-24 ENCOUNTER — APPOINTMENT (OUTPATIENT)
Dept: ULTRASOUND IMAGING | Facility: CLINIC | Age: 39
End: 2024-06-24
Payer: COMMERCIAL

## 2024-06-24 PROCEDURE — 76830 TRANSVAGINAL US NON-OB: CPT

## 2024-06-26 LAB — CANCER AG125 SERPL-ACNC: 11 U/ML

## 2024-07-01 ENCOUNTER — APPOINTMENT (OUTPATIENT)
Dept: GYNECOLOGIC ONCOLOGY | Facility: CLINIC | Age: 39
End: 2024-07-01
Payer: COMMERCIAL

## 2024-07-01 VITALS
WEIGHT: 217 LBS | BODY MASS INDEX: 37.05 KG/M2 | SYSTOLIC BLOOD PRESSURE: 131 MMHG | OXYGEN SATURATION: 100 % | RESPIRATION RATE: 16 BRPM | DIASTOLIC BLOOD PRESSURE: 87 MMHG | HEART RATE: 73 BPM | HEIGHT: 64 IN

## 2024-07-01 PROCEDURE — 99212 OFFICE O/P EST SF 10 MIN: CPT

## 2024-07-01 PROCEDURE — 99459 PELVIC EXAMINATION: CPT

## 2025-06-26 NOTE — BRIEF OPERATIVE NOTE - ANTIBIOTIC PROTOCOL
[FreeTextEntry1] : A portion of this note was written by [Kd Powell] on 06/24/2025 acting as a scribe for Dr. Silva.   I have personally reviewed the chart and agree that the record accurately reflects my personal performance of the history, physical exam, assessment, and plan.  Followed protocol

## 2025-08-28 ENCOUNTER — APPOINTMENT (OUTPATIENT)
Dept: FAMILY MEDICINE | Facility: CLINIC | Age: 40
End: 2025-08-28